# Patient Record
Sex: FEMALE | URBAN - NONMETROPOLITAN AREA
[De-identification: names, ages, dates, MRNs, and addresses within clinical notes are randomized per-mention and may not be internally consistent; named-entity substitution may affect disease eponyms.]

---

## 2021-08-24 ENCOUNTER — APPOINTMENT (RX ONLY)
Dept: URBAN - NONMETROPOLITAN AREA CLINIC 16 | Facility: CLINIC | Age: 76
Setting detail: DERMATOLOGY
End: 2021-08-24

## 2021-08-24 VITALS — WEIGHT: 120 LBS | HEIGHT: 61.5 IN

## 2021-08-24 DIAGNOSIS — Z71.89 OTHER SPECIFIED COUNSELING: ICD-10-CM

## 2021-08-24 DIAGNOSIS — L72.0 EPIDERMAL CYST: ICD-10-CM | Status: IMPROVED

## 2021-08-24 PROCEDURE — ? CONSULTATION EXCISION

## 2021-08-24 PROCEDURE — ? COUNSELING

## 2021-08-24 PROCEDURE — ? FULL BODY SKIN EXAM - DECLINED

## 2021-08-24 PROCEDURE — ? TREATMENT REGIMEN

## 2021-08-24 PROCEDURE — 99203 OFFICE O/P NEW LOW 30 MIN: CPT

## 2021-08-24 PROCEDURE — ? PHOTO-DOCUMENTATION

## 2021-08-24 ASSESSMENT — LOCATION ZONE DERM: LOCATION ZONE: ARM

## 2021-08-24 ASSESSMENT — LOCATION DETAILED DESCRIPTION DERM: LOCATION DETAILED: LEFT POSTERIOR SHOULDER

## 2021-08-24 ASSESSMENT — LOCATION SIMPLE DESCRIPTION DERM: LOCATION SIMPLE: LEFT SHOULDER

## 2021-08-24 NOTE — PROCEDURE: TREATMENT REGIMEN
Detail Level: Zone
Plan: Pt and her daughter state that they were seen in ED roughly one month ago. At that time lesions was edematous, erythematous and purulent discharge was drained by the area. Pt was then placed on Keflex. \\n\\nSince that treatment pt states that the lesion is no longer painful and seems to be healing. Daughter was able to present a photo of previous infection. \\n\\nI do not suspect any current infection. Pt was given the option of Exc vs observation. We did discuss excision procedure in detail. Pt states that at this time she would like to observe location. \\n\\nIf infection occurs again, enlarging, becoming erythematous and painful they will call for abx therapy. After infection control was achieved I would then recommend Exc for removal of present subcutaneous cyst.